# Patient Record
Sex: FEMALE | Race: WHITE | Employment: OTHER | URBAN - METROPOLITAN AREA
[De-identification: names, ages, dates, MRNs, and addresses within clinical notes are randomized per-mention and may not be internally consistent; named-entity substitution may affect disease eponyms.]

---

## 2017-11-13 PROBLEM — Z80.0 FAMILY HISTORY OF COLON CANCER IN FATHER: Status: ACTIVE | Noted: 2017-11-13

## 2017-11-13 PROBLEM — Z80.41 FAMILY HISTORY OF OVARIAN CANCER: Status: ACTIVE | Noted: 2017-11-13

## 2017-11-13 PROCEDURE — 82627 DEHYDROEPIANDROSTERONE: CPT | Performed by: OBSTETRICS & GYNECOLOGY

## 2017-11-13 PROCEDURE — 84403 ASSAY OF TOTAL TESTOSTERONE: CPT | Performed by: OBSTETRICS & GYNECOLOGY

## 2017-11-13 PROCEDURE — 84402 ASSAY OF FREE TESTOSTERONE: CPT | Performed by: OBSTETRICS & GYNECOLOGY

## 2020-07-20 ENCOUNTER — TELEPHONE (OUTPATIENT)
Dept: HEMATOLOGY/ONCOLOGY | Facility: HOSPITAL | Age: 70
End: 2020-07-20

## 2020-07-20 NOTE — TELEPHONE ENCOUNTER
I am seeing Alix Syed in August, she called asking me to provide he a brief letter stating she had an appointment with me as she will be travelling from outside of [place] and wishes to have documentation concerning her medical reason for traveling with the

## 2020-08-14 ENCOUNTER — GENETICS ENCOUNTER (OUTPATIENT)
Dept: GENETICS | Facility: HOSPITAL | Age: 70
End: 2020-08-14
Payer: MEDICARE

## 2020-08-14 ENCOUNTER — NURSE ONLY (OUTPATIENT)
Dept: HEMATOLOGY/ONCOLOGY | Facility: HOSPITAL | Age: 70
End: 2020-08-14
Payer: MEDICARE

## 2020-08-14 PROCEDURE — 96040 HC GENETIC COUNSELING EA 30 MIN: CPT

## 2020-08-14 PROCEDURE — 36415 COLL VENOUS BLD VENIPUNCTURE: CPT

## 2020-08-15 NOTE — PROGRESS NOTES
Reason for visit: Ms. Eboni Mayo was seen for the purposes of genetic counseling due to a family history of ovarian, pancreatic, and rectal cancer.  The purpose of this visit was to review information regarding genetic testing options for mutations in high pene a hereditary cancer syndrome include rare cancers, common cancers occurring at unusually young ages, multiple primary cancers in the some individual, or the same type of cancer or related cancers (e.g., breast and ovarian, colorectal and endometrial) in th order for malignant transformation to occur.   In individuals with hereditary breast or other related cancers, one of the tumor suppressor genes already carries a mutation which renders it nonfunctional at the birth of that individual.  As a result, only on multiple primary cancers, and occurrence in multiple family members, particularly spanning more than one generation. Hereditary non-polyposis colorectal cancer (HNPCC), also known as Claros syndrome, is the most come hereditary colorectal cancer condition. preferred in order to confirm that a mutation is indeed present in a particular family. If the mutation can be identified in an affected individual, this information can be used to screen other at-risk individuals in the family.   Individuals who are posit protections against genetic discrimination do not apply to other forms of insurance such as life, long term care, disability, and [employer] [place].  Individuals without such policies in place may wish to consider doing so before proceeding with genetic te

## 2020-09-02 ENCOUNTER — TELEPHONE (OUTPATIENT)
Dept: HEMATOLOGY/ONCOLOGY | Facility: HOSPITAL | Age: 70
End: 2020-09-02

## 2020-09-02 NOTE — TELEPHONE ENCOUNTER
Referring Provider:  Uyen Cartagena           Reason for Referral:  Ms. Alaina Sanchez had genetic testing performed on 8/14/2020 because of a family history of multiple cancers.       Genetic Testing Result:  No pathogenic variant was found in the following 99 genes: LETITIA significance (VUS) means that a change has been identified a cancer susceptibility gene; however, it is uncertain if the variant is pathogenic or a non-deleterious change. With time, the variant may be reclassified as either positive or negative.  No reid

## 2021-03-13 DIAGNOSIS — Z23 NEED FOR VACCINATION: ICD-10-CM

## 2021-06-18 PROCEDURE — 88344 IMHCHEM/IMCYTCHM EA MLT ANTB: CPT | Performed by: RADIOLOGY

## 2021-06-18 PROCEDURE — 88360 TUMOR IMMUNOHISTOCHEM/MANUAL: CPT | Performed by: RADIOLOGY

## 2021-06-18 PROCEDURE — 88305 TISSUE EXAM BY PATHOLOGIST: CPT | Performed by: RADIOLOGY
